# Patient Record
Sex: MALE | Race: BLACK OR AFRICAN AMERICAN | NOT HISPANIC OR LATINO | Employment: STUDENT | URBAN - METROPOLITAN AREA
[De-identification: names, ages, dates, MRNs, and addresses within clinical notes are randomized per-mention and may not be internally consistent; named-entity substitution may affect disease eponyms.]

---

## 2024-02-26 ENCOUNTER — HOSPITAL ENCOUNTER (EMERGENCY)
Facility: HOSPITAL | Age: 34
Discharge: HOME | End: 2024-02-27
Attending: EMERGENCY MEDICINE
Payer: COMMERCIAL

## 2024-02-26 VITALS
DIASTOLIC BLOOD PRESSURE: 79 MMHG | TEMPERATURE: 98 F | RESPIRATION RATE: 16 BRPM | WEIGHT: 180 LBS | HEART RATE: 53 BPM | OXYGEN SATURATION: 98 % | SYSTOLIC BLOOD PRESSURE: 134 MMHG

## 2024-02-26 DIAGNOSIS — R00.2 PALPITATIONS: ICD-10-CM

## 2024-02-26 LAB
ALBUMIN SERPL BCP-MCNC: 4.4 G/DL (ref 3.5–5.2)
ALP SERPL-CCNC: 66 U/L (ref 55–135)
ALT SERPL W/O P-5'-P-CCNC: 23 U/L (ref 10–44)
ANION GAP SERPL CALC-SCNC: 10 MMOL/L (ref 8–16)
AST SERPL-CCNC: 18 U/L (ref 10–40)
BASOPHILS # BLD AUTO: 0.03 K/UL (ref 0–0.2)
BASOPHILS NFR BLD: 0.5 % (ref 0–1.9)
BILIRUB SERPL-MCNC: 0.4 MG/DL (ref 0.1–1)
BUN SERPL-MCNC: 8 MG/DL (ref 6–20)
CALCIUM SERPL-MCNC: 10.1 MG/DL (ref 8.7–10.5)
CHLORIDE SERPL-SCNC: 104 MMOL/L (ref 95–110)
CO2 SERPL-SCNC: 27 MMOL/L (ref 23–29)
CREAT SERPL-MCNC: 1.2 MG/DL (ref 0.5–1.4)
DIFFERENTIAL METHOD BLD: NORMAL
EOSINOPHIL # BLD AUTO: 0.1 K/UL (ref 0–0.5)
EOSINOPHIL NFR BLD: 1 % (ref 0–8)
ERYTHROCYTE [DISTWIDTH] IN BLOOD BY AUTOMATED COUNT: 14.3 % (ref 11.5–14.5)
EST. GFR  (NO RACE VARIABLE): >60 ML/MIN/1.73 M^2
GLUCOSE SERPL-MCNC: 84 MG/DL (ref 70–110)
HCT VFR BLD AUTO: 50.9 % (ref 40–54)
HGB BLD-MCNC: 16.6 G/DL (ref 14–18)
IMM GRANULOCYTES # BLD AUTO: 0.01 K/UL (ref 0–0.04)
IMM GRANULOCYTES NFR BLD AUTO: 0.2 % (ref 0–0.5)
LYMPHOCYTES # BLD AUTO: 2.5 K/UL (ref 1–4.8)
LYMPHOCYTES NFR BLD: 42.4 % (ref 18–48)
MCH RBC QN AUTO: 27.9 PG (ref 27–31)
MCHC RBC AUTO-ENTMCNC: 32.6 G/DL (ref 32–36)
MCV RBC AUTO: 85 FL (ref 82–98)
MONOCYTES # BLD AUTO: 0.5 K/UL (ref 0.3–1)
MONOCYTES NFR BLD: 7.6 % (ref 4–15)
NEUTROPHILS # BLD AUTO: 2.9 K/UL (ref 1.8–7.7)
NEUTROPHILS NFR BLD: 48.3 % (ref 38–73)
NRBC BLD-RTO: 0 /100 WBC
PLATELET # BLD AUTO: 217 K/UL (ref 150–450)
PMV BLD AUTO: 11.1 FL (ref 9.2–12.9)
POTASSIUM SERPL-SCNC: 4.3 MMOL/L (ref 3.5–5.1)
PROT SERPL-MCNC: 7.9 G/DL (ref 6–8.4)
RBC # BLD AUTO: 5.96 M/UL (ref 4.6–6.2)
SODIUM SERPL-SCNC: 141 MMOL/L (ref 136–145)
TSH SERPL DL<=0.005 MIU/L-ACNC: 1.06 UIU/ML (ref 0.4–4)
WBC # BLD AUTO: 5.9 K/UL (ref 3.9–12.7)

## 2024-02-26 PROCEDURE — 99284 EMERGENCY DEPT VISIT MOD MDM: CPT | Mod: 25

## 2024-02-26 PROCEDURE — 93005 ELECTROCARDIOGRAM TRACING: CPT

## 2024-02-26 PROCEDURE — 85025 COMPLETE CBC W/AUTO DIFF WBC: CPT | Performed by: EMERGENCY MEDICINE

## 2024-02-26 PROCEDURE — 85379 FIBRIN DEGRADATION QUANT: CPT | Performed by: EMERGENCY MEDICINE

## 2024-02-26 PROCEDURE — 84443 ASSAY THYROID STIM HORMONE: CPT | Performed by: EMERGENCY MEDICINE

## 2024-02-26 PROCEDURE — 93010 ELECTROCARDIOGRAM REPORT: CPT | Mod: ,,, | Performed by: INTERNAL MEDICINE

## 2024-02-26 PROCEDURE — 80053 COMPREHEN METABOLIC PANEL: CPT | Performed by: EMERGENCY MEDICINE

## 2024-02-27 LAB
D DIMER PPP IA.FEU-MCNC: 0.28 MG/L FEU
OHS QRS DURATION: 80 MS
OHS QTC CALCULATION: 369 MS

## 2024-02-27 NOTE — ED PROVIDER NOTES
Encounter Date: 2024       History     Chief Complaint   Patient presents with    Palpitations     Intermittent palpitations x2 wks. Denies cardiac hx and chest pain     Patient is a 33-year-old male with no significant past medical history presenting today for palpitations.  He recently traveled to [facility] for his father's  on , returned .  Since he is returned to the [facility] [place], he has had intermittent palpitations.  It occurs mostly with rest.  He describes it as heart racing that last for several minutes and then stops spontaneously.  He does not feel anxious, near syncopal or short of breath during the event.  He denies chest pain.  No fevers or chills noted.  Denies leg pain or calf swelling.  No family history of clots or heart attacks.        Review of patient's allergies indicates:  No Known Allergies  No past medical history on file.  No past surgical history on file.  No family history on file.     Review of Systems    Physical Exam     Initial Vitals [24 1905]   BP Pulse Resp Temp SpO2   132/74 66 15 98.2 °F (36.8 °C) 100 %      MAP       --         Physical Exam    Nursing note and vitals reviewed.  Constitutional: He appears well-developed and well-nourished. No distress.   HENT:   Mouth/Throat: Oropharynx is clear and moist.   Eyes: Conjunctivae are normal.   Neck: Neck supple.   Cardiovascular:  Normal rate, regular rhythm and intact distal pulses.           Pulmonary/Chest: Breath sounds normal. He has no wheezes. He has no rales.   Abdominal: Abdomen is soft. Bowel sounds are normal. There is no abdominal tenderness.   Musculoskeletal:         General: No edema.      Cervical back: Neck supple.     Lymphadenopathy:     He has no cervical adenopathy.   Neurological: He is alert and oriented to person, place, and time.   Skin: No rash noted.   Psychiatric: He has a normal mood and affect.         ED Course   Procedures  Labs Reviewed   CBC W/ AUTO DIFFERENTIAL   COMPREHENSIVE  METABOLIC PANEL   TSH   D DIMER, QUANTITATIVE     EKG Readings: (Independently Interpreted)   EKG:  Sinus rhythm at 62, sinus arrhythmia, no acute ischemic changes       Imaging Results    None          Medications - No data to display  Medical Decision Making  Emergent evaluation of 33-year-old male here for intermittent palpitations since his return from [place].  Denies chest pain, shortness of breath or near-syncope.  No infectious symptoms.  Broad workup including labs and EKG performed which are reassuring.  No indication for admission at this time.  Recommend follow-up with PCP regarding his symptoms.    Amount and/or Complexity of Data Reviewed  Labs:  Decision-making details documented in ED Course.               ED Course as of 02/27/24 0054   Tue Feb 27, 2024   0021 D-Dimer: 0.28 [GM]   0021 TSH: 1.062 [GM]   0021 WBC: 5.90 [GM]   0021 Hemoglobin: 16.6 [GM]   0021 BUN: 8 [GM]   0021 Creatinine: 1.2 [GM]   0021 Labs reviewed with no leukocytosis.  Hemoglobin stable.  CMP largely unremarkable.  TSH negative.  Dimer also negative.  Have low suspicion for life-threatening process at this time.  I have recommended he follow with primary care doctor for further outpatient evaluation if symptoms persist.   [GM]      ED Course User Index  [GM] Maddy Fay MD                           Clinical Impression:  Final diagnoses:  [R00.2] Palpitations          ED Disposition Condition    Discharge Stable          ED Prescriptions    None       Follow-up Information       Follow up With Specialties Details Why Contact Info Additional Information    [facility] - Emergency Dept Emergency Medicine  If symptoms worsen [address]  [facility] [address] [phone]     [facility] Internal Medicine Call in 1 week  [address]  [facility] [address] [phone] [facility] Please park in surface lot and check in at central  registration Maddy Leonard MD  02/27/24 0027       Maddy Fay MD  02/27/24 0055

## 2024-02-27 NOTE — DISCHARGE INSTRUCTIONS
Your labs are very reassuring.  Please follow up with the primary doctor for further outpatient workup if symptoms persist.

## 2024-02-27 NOTE — FIRST PROVIDER EVALUATION
Medical screening examination initiated.  I have conducted a focused provider triage encounter, findings are as follows:    Brief history of present illness:  Palpitation on and off for the past 17 days.  No chest pain or syncope.  No cardiac history.  No stimulants, diet medications, cold medications.    There were no vitals filed for this visit.    Pertinent physical exam:  comfortable, reassuring vitals    Brief workup plan:  labs ekg    Preliminary workup initiated; this workup will be continued and followed by the physician or advanced practice provider that is assigned to the patient when roomed.

## 2024-03-15 ENCOUNTER — OFFICE VISIT (OUTPATIENT)
Dept: INTERNAL MEDICINE | Facility: CLINIC | Age: 34
End: 2024-03-15
Payer: COMMERCIAL

## 2024-03-15 VITALS
WEIGHT: 180.13 LBS | OXYGEN SATURATION: 99 % | BODY MASS INDEX: 28.95 KG/M2 | HEIGHT: 66 IN | HEART RATE: 65 BPM | DIASTOLIC BLOOD PRESSURE: 75 MMHG | SYSTOLIC BLOOD PRESSURE: 120 MMHG

## 2024-03-15 DIAGNOSIS — R00.2 PALPITATIONS: Primary | ICD-10-CM

## 2024-03-15 PROCEDURE — 99203 OFFICE O/P NEW LOW 30 MIN: CPT | Mod: S$GLB,,,

## 2024-03-15 PROCEDURE — 99999 PR PBB SHADOW E&M-EST. PATIENT-LVL IV: CPT | Mod: PBBFAC,,,

## 2024-03-15 PROCEDURE — 3008F BODY MASS INDEX DOCD: CPT | Mod: CPTII,S$GLB,,

## 2024-03-15 PROCEDURE — 1160F RVW MEDS BY RX/DR IN RCRD: CPT | Mod: CPTII,S$GLB,,

## 2024-03-15 PROCEDURE — 3078F DIAST BP <80 MM HG: CPT | Mod: CPTII,S$GLB,,

## 2024-03-15 PROCEDURE — 3074F SYST BP LT 130 MM HG: CPT | Mod: CPTII,S$GLB,,

## 2024-03-15 PROCEDURE — 1159F MED LIST DOCD IN RCRD: CPT | Mod: CPTII,S$GLB,,

## 2024-03-15 NOTE — PROGRESS NOTES
History & Physical  [facility]    Patient Name: Jyoti Red  YOB: 1990   MRN: [id number]    Subjective     Chief Complaint:    History of Present Illness:  Mr. Jyoti Red is a 33 y.o. male with Mhx of prediabetes who presents today as ED follow-up. He was recently seen in the ED for palpitation on . Their workup was grossly unremarkable include EKG with NSR.     Patient had returned from [place] from his dad's . There was a lot of stress he was handling with dad's , training program, and traveling. Last week he was dealing with sick sister who depends on him.    Palpitations have decreased since the ED visit. But for the last 3 days he has noticed it 5 times daily. Lasts for sometimes 5 minutes. Reports that sometimes it associated with stress and lack of rest but not always.    His PCP is in [place], [place].     EDMUNDO 7 score is 8 (mild anxiety)    Review of Systems   Constitutional:  Negative for chills, diaphoresis, fever and malaise/fatigue.   Respiratory:  Negative for cough and shortness of breath.    Cardiovascular:  Positive for palpitations. Negative for chest pain.   Gastrointestinal:  Negative for abdominal pain, blood in stool, constipation, diarrhea, nausea and vomiting.   Genitourinary:  Negative for dysuria, frequency and hematuria.   Musculoskeletal:  Negative for joint pain and myalgias.   Neurological:  Negative for dizziness and headaches.   Psychiatric/Behavioral:  Negative for depression. The patient is nervous/anxious.        PAST HISTORY:     Past Medical History:   Diagnosis Date    Prediabetes        History reviewed. No pertinent surgical history.    History reviewed. No pertinent family history.    Social History     Socioeconomic History    Marital status: Single       MEDICATIONS & ALLERGIES:     No current outpatient medications on file prior to visit.     No current facility-administered medications on file prior to  "visit.       Review of patient's allergies indicates:   Allergen Reactions    Chloroquine Itching       OBJECTIVE:     Vital Signs:  Vitals:    03/15/24 1040   BP: 120/75   BP Location: Right arm   Patient Position: Sitting   BP Method: Medium (Automatic)   Pulse: 65   SpO2: 99%   Weight: 81.7 kg (180 lb 1.9 oz)   Height: 5' 6" (1.676 m)       Body mass index is 29.07 kg/m².     Physical Exam:  General:  Well developed, well nourished, no acute distress  Head: Normocephalic, atraumatic  Eyes: PERRL, EOMI, clear sclera  CVS:  RRR, S1 and S2 normal, no murmurs, rubs, gallops, 2+ peripheral pulses  Resp:  Lungs clear to auscultation, no wheezes, rales, rhonchi, cough  GI:  Abdomen soft, non-tender, non-distended, normoactive bowel sounds  MSK:  No muscle atrophy, cyanosis, peripheral edema   Skin:  No rashes, ulcers, erythema  Neuro:  CNII-XII grossly intact, no focal deficits noted  Psych:  Appropriate mood and affect, normal judgement        Laboratory  Lab Results   Component Value Date    WBC 5.90 02/26/2024    HGB 16.6 02/26/2024    HCT 50.9 02/26/2024    MCV 85 02/26/2024     02/26/2024     @[id number](GLU,NA,K,Cl,CO2,BUN,Creatinine,Calcium,MG)@  No results found for: "INR", "PROTIME"  No results found for: "HGBA1C"  No results for input(s): "POCTGLUCOSE" in the last 72 hours.    Diagnostic Results:  Labs: Reviewed    ASSESSMENT & PLAN:   Mr. Jyoti Red is a 33 y.o. male who presents today for ED follow-up.     Since he is still experiencing frequent palpitation, I would like to get a 48 hr Holter monitor to r/o arrhythmias.    Showed patient how to use pulse ox.     He was advised on the following:   If the palpitation worsen and/or become unbearable, please return to the ED immediately  If you start to experience lightheadedness, excessive sweating, chest pain, or shortness of breath with the palpitations then please present to ED immediately  In 2 days, reach out and let me know how the symptoms are " doing  Wear the Holter monitor for 2 days  If you are having difficulties obtaining a Holter monitor; then please buy a regular pulse oximetry (found on amaz[employer]). Whenever you feel the palpitations please place the pulse oximetry on your finger, and record the average heart rate/pulse rate. Do this for 1-2 days. Contact the clinic with those values.   Please set up Katherine          Jyoti was seen today for establish care.    Diagnoses and all orders for this visit:    Palpitations  -     Ambulatory referral/consult to Internal Medicine  -     Holter monitor - 48 hour; Future            Case discussed with Dr Wen Silvestre MD  Internal Medicine PGY-1  [facility]  [address] [place] [address] [id number] [phone]

## 2024-03-15 NOTE — PATIENT INSTRUCTIONS
If the palpitation worsen and/or become unbearable, please return to the ED immediately  If you start to experience lightheadedness, excessive sweating, chest pain, or shortness of breath with the palpitations then please present to ED immediately  In 2 days, reach out and let me know how the symptoms are doing  Wear the Holter monitor for 2 days  If you are having difficulties obtaining a Holter monitor; then please buy a regular pulse oximetry (found on amaz[employer]). Whenever you feel the palpitations please place the pulse oximetry on your finger, and record the average heart rate/pulse rate. Do this for 1-2 days. Contact the clinic with those values.   Please set up [facility]